# Patient Record
Sex: FEMALE | Race: WHITE | Employment: OTHER | URBAN - METROPOLITAN AREA
[De-identification: names, ages, dates, MRNs, and addresses within clinical notes are randomized per-mention and may not be internally consistent; named-entity substitution may affect disease eponyms.]

---

## 2021-08-11 ENCOUNTER — APPOINTMENT (OUTPATIENT)
Dept: CARDIOLOGY | Facility: CLINIC | Age: 86
DRG: 282 | End: 2021-08-11
Attending: NURSE PRACTITIONER
Payer: MEDICARE

## 2021-08-11 ENCOUNTER — TRANSFERRED RECORDS (OUTPATIENT)
Dept: HEALTH INFORMATION MANAGEMENT | Facility: CLINIC | Age: 86
End: 2021-08-11

## 2021-08-11 ENCOUNTER — HOSPITAL ENCOUNTER (INPATIENT)
Facility: CLINIC | Age: 86
LOS: 1 days | Discharge: HOME | DRG: 282 | End: 2021-08-12
Attending: INTERNAL MEDICINE | Admitting: INTERNAL MEDICINE
Payer: MEDICARE

## 2021-08-11 DIAGNOSIS — I50.33 ACUTE ON CHRONIC DIASTOLIC HEART FAILURE (H): ICD-10-CM

## 2021-08-11 DIAGNOSIS — I50.30 HEART FAILURE WITH PRESERVED EJECTION FRACTION, NYHA CLASS II (H): Primary | ICD-10-CM

## 2021-08-11 DIAGNOSIS — I10 BENIGN ESSENTIAL HYPERTENSION: ICD-10-CM

## 2021-08-11 PROBLEM — I21.4 NSTEMI (NON-ST ELEVATED MYOCARDIAL INFARCTION) (H): Status: ACTIVE | Noted: 2021-08-11

## 2021-08-11 LAB
ALBUMIN SERPL-MCNC: 3 G/DL (ref 3.4–5)
ALP SERPL-CCNC: 61 U/L (ref 40–150)
ALT SERPL W P-5'-P-CCNC: 26 U/L (ref 0–50)
ANION GAP SERPL CALCULATED.3IONS-SCNC: 4 MMOL/L (ref 3–14)
AST SERPL W P-5'-P-CCNC: 16 U/L (ref 0–45)
BI-PLANE LVEF ECHO: NORMAL
BILIRUB DIRECT SERPL-MCNC: <0.1 MG/DL (ref 0–0.2)
BILIRUB SERPL-MCNC: 0.3 MG/DL (ref 0.2–1.3)
BUN SERPL-MCNC: 31 MG/DL (ref 7–30)
CALCIUM SERPL-MCNC: 8.9 MG/DL (ref 8.5–10.1)
CHLORIDE BLD-SCNC: 110 MMOL/L (ref 94–109)
CHOLEST SERPL-MCNC: 154 MG/DL
CO2 SERPL-SCNC: 26 MMOL/L (ref 20–32)
CREAT SERPL-MCNC: 0.99 MG/DL (ref 0.52–1.04)
ERYTHROCYTE [DISTWIDTH] IN BLOOD BY AUTOMATED COUNT: 13 % (ref 10–15)
FASTING STATUS PATIENT QL REPORTED: YES
GFR SERPL CREATININE-BSD FRML MDRD: 49 ML/MIN/1.73M2
GLUCOSE BLD-MCNC: 130 MG/DL (ref 70–99)
HCT VFR BLD AUTO: 41.8 % (ref 35–47)
HDLC SERPL-MCNC: 66 MG/DL
HGB BLD-MCNC: 13.8 G/DL (ref 11.7–15.7)
LDLC SERPL CALC-MCNC: 63 MG/DL
LVEF ECHO: NORMAL
MCH RBC QN AUTO: 32 PG (ref 26.5–33)
MCHC RBC AUTO-ENTMCNC: 33 G/DL (ref 31.5–36.5)
MCV RBC AUTO: 97 FL (ref 78–100)
NONHDLC SERPL-MCNC: 88 MG/DL
PLATELET # BLD AUTO: 260 10E3/UL (ref 150–450)
POTASSIUM BLD-SCNC: 4.3 MMOL/L (ref 3.4–5.3)
PROT SERPL-MCNC: 6.7 G/DL (ref 6.8–8.8)
RBC # BLD AUTO: 4.31 10E6/UL (ref 3.8–5.2)
SODIUM SERPL-SCNC: 140 MMOL/L (ref 133–144)
TRIGL SERPL-MCNC: 127 MG/DL
TROPONIN I SERPL-MCNC: 0.86 UG/L (ref 0–0.04)
TROPONIN I SERPL-MCNC: 0.89 UG/L (ref 0–0.04)
WBC # BLD AUTO: 14.4 10E3/UL (ref 4–11)

## 2021-08-11 PROCEDURE — 93306 TTE W/DOPPLER COMPLETE: CPT

## 2021-08-11 PROCEDURE — 80048 BASIC METABOLIC PNL TOTAL CA: CPT | Performed by: NURSE PRACTITIONER

## 2021-08-11 PROCEDURE — 80061 LIPID PANEL: CPT | Performed by: NURSE PRACTITIONER

## 2021-08-11 PROCEDURE — 85027 COMPLETE CBC AUTOMATED: CPT | Performed by: NURSE PRACTITIONER

## 2021-08-11 PROCEDURE — 36415 COLL VENOUS BLD VENIPUNCTURE: CPT | Performed by: NURSE PRACTITIONER

## 2021-08-11 PROCEDURE — 93010 ELECTROCARDIOGRAM REPORT: CPT | Performed by: INTERNAL MEDICINE

## 2021-08-11 PROCEDURE — 250N000013 HC RX MED GY IP 250 OP 250 PS 637: Performed by: NURSE PRACTITIONER

## 2021-08-11 PROCEDURE — 99223 1ST HOSP IP/OBS HIGH 75: CPT | Mod: 25 | Performed by: INTERNAL MEDICINE

## 2021-08-11 PROCEDURE — 250N000011 HC RX IP 250 OP 636: Performed by: NURSE PRACTITIONER

## 2021-08-11 PROCEDURE — 84484 ASSAY OF TROPONIN QUANT: CPT | Performed by: NURSE PRACTITIONER

## 2021-08-11 PROCEDURE — 93306 TTE W/DOPPLER COMPLETE: CPT | Mod: 26 | Performed by: INTERNAL MEDICINE

## 2021-08-11 PROCEDURE — 82248 BILIRUBIN DIRECT: CPT | Performed by: NURSE PRACTITIONER

## 2021-08-11 PROCEDURE — 93005 ELECTROCARDIOGRAM TRACING: CPT

## 2021-08-11 PROCEDURE — 120N000003 HC R&B IMCU UMMC

## 2021-08-11 RX ORDER — NITROGLYCERIN 0.4 MG/1
0.4 TABLET SUBLINGUAL EVERY 5 MIN PRN
Status: DISCONTINUED | OUTPATIENT
Start: 2021-08-11 | End: 2021-08-12 | Stop reason: HOSPADM

## 2021-08-11 RX ORDER — HEPARIN SODIUM 5000 [USP'U]/.5ML
5000 INJECTION, SOLUTION INTRAVENOUS; SUBCUTANEOUS EVERY 12 HOURS
Status: DISCONTINUED | OUTPATIENT
Start: 2021-08-11 | End: 2021-08-12 | Stop reason: HOSPADM

## 2021-08-11 RX ORDER — MAGNESIUM HYDROXIDE/ALUMINUM HYDROXICE/SIMETHICONE 120; 1200; 1200 MG/30ML; MG/30ML; MG/30ML
30 SUSPENSION ORAL EVERY 4 HOURS PRN
Status: DISCONTINUED | OUTPATIENT
Start: 2021-08-11 | End: 2021-08-12 | Stop reason: HOSPADM

## 2021-08-11 RX ORDER — ALBUTEROL SULFATE 0.83 MG/ML
2.5 SOLUTION RESPIRATORY (INHALATION)
Status: DISCONTINUED | OUTPATIENT
Start: 2021-08-11 | End: 2021-08-12 | Stop reason: HOSPADM

## 2021-08-11 RX ORDER — ATORVASTATIN CALCIUM 40 MG/1
40 TABLET, FILM COATED ORAL EVERY EVENING
Status: DISCONTINUED | OUTPATIENT
Start: 2021-08-11 | End: 2021-08-12 | Stop reason: HOSPADM

## 2021-08-11 RX ORDER — ACETAMINOPHEN 325 MG/1
650 TABLET ORAL EVERY 4 HOURS PRN
Status: DISCONTINUED | OUTPATIENT
Start: 2021-08-11 | End: 2021-08-12 | Stop reason: HOSPADM

## 2021-08-11 RX ORDER — FUROSEMIDE 10 MG/ML
40 INJECTION INTRAMUSCULAR; INTRAVENOUS 2 TIMES DAILY
Status: DISCONTINUED | OUTPATIENT
Start: 2021-08-11 | End: 2021-08-12

## 2021-08-11 RX ORDER — ASPIRIN 81 MG/1
81 TABLET, CHEWABLE ORAL DAILY
Status: DISCONTINUED | OUTPATIENT
Start: 2021-08-11 | End: 2021-08-12 | Stop reason: HOSPADM

## 2021-08-11 RX ORDER — LISINOPRIL 10 MG/1
10 TABLET ORAL DAILY
Status: DISCONTINUED | OUTPATIENT
Start: 2021-08-11 | End: 2021-08-12

## 2021-08-11 RX ORDER — CLOPIDOGREL BISULFATE 75 MG/1
75 TABLET ORAL DAILY
Status: DISCONTINUED | OUTPATIENT
Start: 2021-08-11 | End: 2021-08-12 | Stop reason: HOSPADM

## 2021-08-11 RX ORDER — AMLODIPINE BESYLATE 10 MG/1
10 TABLET ORAL DAILY
Status: DISCONTINUED | OUTPATIENT
Start: 2021-08-11 | End: 2021-08-12 | Stop reason: HOSPADM

## 2021-08-11 RX ORDER — LIDOCAINE 40 MG/G
CREAM TOPICAL
Status: DISCONTINUED | OUTPATIENT
Start: 2021-08-11 | End: 2021-08-12 | Stop reason: HOSPADM

## 2021-08-11 RX ORDER — ISOSORBIDE MONONITRATE 60 MG/1
240 TABLET, EXTENDED RELEASE ORAL DAILY
Status: DISCONTINUED | OUTPATIENT
Start: 2021-08-11 | End: 2021-08-12 | Stop reason: HOSPADM

## 2021-08-11 RX ORDER — RANOLAZINE 500 MG/1
500 TABLET, EXTENDED RELEASE ORAL 2 TIMES DAILY
Status: DISCONTINUED | OUTPATIENT
Start: 2021-08-11 | End: 2021-08-12 | Stop reason: HOSPADM

## 2021-08-11 RX ORDER — ACETAMINOPHEN 650 MG/1
650 SUPPOSITORY RECTAL EVERY 4 HOURS PRN
Status: DISCONTINUED | OUTPATIENT
Start: 2021-08-11 | End: 2021-08-12 | Stop reason: HOSPADM

## 2021-08-11 RX ADMIN — AMLODIPINE BESYLATE 10 MG: 10 TABLET ORAL at 12:58

## 2021-08-11 RX ADMIN — ASPIRIN 81 MG: 81 TABLET, CHEWABLE ORAL at 12:58

## 2021-08-11 RX ADMIN — METOPROLOL TARTRATE 75 MG: 50 TABLET, FILM COATED ORAL at 12:58

## 2021-08-11 RX ADMIN — FUROSEMIDE 40 MG: 10 INJECTION, SOLUTION INTRAVENOUS at 19:45

## 2021-08-11 RX ADMIN — ISOSORBIDE MONONITRATE 240 MG: 60 TABLET, EXTENDED RELEASE ORAL at 17:10

## 2021-08-11 RX ADMIN — ATORVASTATIN CALCIUM 40 MG: 40 TABLET, FILM COATED ORAL at 19:44

## 2021-08-11 RX ADMIN — FUROSEMIDE 40 MG: 10 INJECTION, SOLUTION INTRAVENOUS at 12:59

## 2021-08-11 RX ADMIN — RANOLAZINE 500 MG: 500 TABLET, FILM COATED, EXTENDED RELEASE ORAL at 19:44

## 2021-08-11 RX ADMIN — LISINOPRIL 10 MG: 10 TABLET ORAL at 17:10

## 2021-08-11 RX ADMIN — CLOPIDOGREL BISULFATE 75 MG: 75 TABLET ORAL at 12:59

## 2021-08-11 RX ADMIN — HEPARIN SODIUM 5000 UNITS: 10000 INJECTION, SOLUTION INTRAVENOUS; SUBCUTANEOUS at 11:43

## 2021-08-11 ASSESSMENT — MIFFLIN-ST. JEOR: SCORE: 1109.53

## 2021-08-11 ASSESSMENT — ACTIVITIES OF DAILY LIVING (ADL)
ADLS_ACUITY_SCORE: 19

## 2021-08-11 NOTE — PROVIDER NOTIFICATION
Provider notified regarding more frequent PAC's. Previously was having 4/min, now having 11/min x 10 minutes. Pt is also bradycardic, 50-60 BPM

## 2021-08-11 NOTE — PLAN OF CARE
A/O X4. Able to make needs known, uses call light appropriately. No c/o of chest pain, SOB, N/V, dizziness, and denies pain. HR SR 60-70's, VSS; afebrile; Lung sounds audible wheezes on RA. Adequate output. bm x1. Pt up ax1 w/ walker to bathroom. EKG, ECHO completed. Tolerating low fat cardiac diet. Daughters at bedside. Will continue to follow plan of care.

## 2021-08-11 NOTE — PROGRESS NOTES
SPIRITUAL HEALTH SERVICES  SPIRITUAL ASSESSMENT Progress Note  [facility] 6B     REFERRAL SOURCE: Admission request    Attempted visit, pt in cares.     PLAN: Will communicate attempted visit to spiritual care team. Spiritual health services remains available for any follow-up or requests    Kia Montanez  Chaplain Resident  Pager: [phone]

## 2021-08-11 NOTE — PROVIDER NOTIFICATION
-------------------CRITICAL LAB VALUE-------------------    Lab Value: trop 0.893  Time of notification: 12:50 PM  MD notified: Cards 1  Patient status:  Temp:  [97.5  F (36.4  C)-98  F (36.7  C)] 98  F (36.7  C)  Pulse:  [66-69] 66  Resp:  [16-22] 16  BP: (162-172)/(67-80) 162/80  SpO2:  [97 %-98 %] 97 %  Orders received:   No new orders at this time. Trending trop levels Q4. Will cont to follow poc and notify team with any updates.

## 2021-08-11 NOTE — H&P
[facility]     History and Physicial  Cynthia Ramirez MRN: [id number]  4/30/1928  Date of Admission:8/11/2021  Primary care provider: Daniele Reagan      Assessment and Plan:     Cynthia Ramirez is a very pleasant 89 year old with past medical history significant for multi-vessel CAD, HTN, moderate aortic stenosis, and grade I diastolic dysfunction. She presented to ED at [facility] with worsening shortness of breath and lower extremity edema. Initial troponin was mildly elevated so patient was transferred to [facility] for concerns of NSTEMI in the setting of decompensated heart failure.     # SOB   # NSTEMI, likely type II   # Acute on chronic HFpEF exacerbation   # Multi-vessel CAD  # HTN  # Moderate aortic stenosis  # Grade I diastolic dysfunction likely 2/2 to obesity and hypertension   Patient reports one episode of chest pain yesterday that was relieved with nitroglycerin x 1. EKG with no acute changes. Initial troponin 0.3. NTpBNP 530 although falsely low due to body habitus. 's. CXR from OSH consistent with congestion. Per chart review, coronary angiogram in 2014 showed severe 3-vessel disease that included  of RCA filled by collaterals, 90% distal LCx with collaterals, as well as significant LAD/D1 disease. Left main was okay. She was referred for CABG, but ultimately decided to medically manage given her age. Echo from 11/2020 with hyperdynamic LV function with EF > 70%, grade I diastolic dysfunction, and mild to moderate aortic stenosis characterized by REYNA 0.98, peak velocity of 2.64 m/s, and MG 15 mmHg. Likely type II MI in the setting of decompensated heart failure and hypertension. Progression of CAD is a possibility, but patient not interested in pursuing further ischemic evaluation at this time. Hypervolemic on exam with 3+ JEAN, abdominal distention, and elevated JVP. Admission weight 162 lbs, EDW unknown.     - Admit to telemetry  - Trend troponins  - Volume  status: hypervolemic, start IV lasix 40 mg BID    - Continue PTA aspirin and plavix  - Continue PTA atorvastatin, metoprolol tartrate, quinapril, Imdur, ranexa, and amlodipine  - Hold PTA hydrochlorothiazide for  now  - Check lipid panel/LFTs  - Daily BMP/CBC  - Replace lytes per protocol  - Strict I/O's  - Daily weights  - Low sodium diet    - EKG prn   - Echocardiogram today       FEN: 2G sodium diet   Prophylaxis:  DVT: subcutaneous heparin   Disposition: Home in 1-2 days with cardiac rehab pending stable post-op period  Code Status: FULL CODE    Sandy Coleman DNP, APRN, CNP  [facility] Cardiology Team  [phone] 7a-5p           Chief Complaint:   Shortness of breath and increased JEAN          History of Present Illness:   Cynthia Ramirez is a very pleasant 89 year old with past medical history significant for multi-vessel CAD, HTN, moderate aortic stenosis, and grade I diastolic dysfunction. She presented to ED at [facility] with worsening shortness of breath and lower extremity edema. Initial troponin was mildly elevated so patient was transferred to [facility] for concerns of NSTEMI in the setting of decompensated heart failure.     Patient states she has progressively been more short of breath over the past week. She also reports worsening lower extremity edema. She had one episode of chest discomfort at rest that was relieved with sublingual nitroglycerin. Denies dizziness, syncope or pre-syncope, PND, orthopnea, or palpitations.     Per chart review, coronary angiogram in 2014 showed severe 3-vessel disease that included  of RCA filled by collaterals, 90% distal LCx with collaterals, as well as significant LAD/D1 disease. Left main was okay. She was referred for CABG, but ultimately decided to medically manage given her age. She follows with cardiology at [facility] in [place], [place]. Echo from 11/2020 with hyperdynamic LV function with EF > 70% and mild to moderate aortic stenosis characterized by REYNA  0.98, peak velocity of 2.64 m/s, and MG 15 mmHg. Current medication regimen is as follows: aspirin, plavix, atorvastatin, Imdur, metoprolol tartrate, ranexa, quinapril, and hydrochlorothiazide.     Upon arrival to floor, patient doing well. Hypertensive with 's. EKG without acute ST or T-wave changes. 3+ bilateral JEAN. Denies any symptoms. Reports improvement in breathing.          Review of Systems:    10 point review of systems negative except for stated above in HPI.          Past Medical History:   Medical History reviewed.   No past medical history on file.          Past Surgical History:   Surgical History reviewed.   No past surgical history on file.          Social History:   Social History reviewed.  Social History     Tobacco Use     Smoking status: Not on file   Substance Use Topics     Alcohol use: Not on file             Family History:   Family History reviewed.   No family history on file.          Allergies:   No Known Allergies          Medications:   Medications Reviewed.   Current Facility-Administered Medications   Medication     acetaminophen (TYLENOL) Suppository 650 mg     acetaminophen (TYLENOL) tablet 650 mg     albuterol (PROVENTIL) neb solution 2.5 mg     alum & mag hydroxide-simethicone (MAALOX) suspension 30 mL     amLODIPine (NORVASC) tablet 10 mg     aspirin (ASA) chewable tablet 81 mg     atorvastatin (LIPITOR) tablet 40 mg     clopidogrel (PLAVIX) tablet 75 mg     furosemide (LASIX) injection 40 mg     heparin ANTICOAGULANT injection 5,000 Units     lidocaine (LMX4) cream     lidocaine 1 % 0.1-1 mL     medication instruction     metoprolol tartrate (LOPRESSOR) tablet 75 mg     nitroGLYcerin (NITROSTAT) sublingual tablet 0.4 mg     sodium chloride (PF) 0.9% PF flush 3 mL     sodium chloride (PF) 0.9% PF flush 3 mL             Physical Exam:   Vitals were reviewed.  Blood pressure (!) 163/69, pulse 58, temperature 97.7  F (36.5  C), temperature source Oral, resp. rate 16, height  "1.6 m (5' 3\"), weight 73.5 kg (162 lb 2 oz), SpO2 97 %.    General: AAOx3, NAD  Skin: Not jaundiced, no rash, no ecchymoses;   HEENT: MMM, PERRLA, EOM intact  CV: RRR, normal S1S2, no murmur, clicks, rubs  Resp: Clear to auscultation bilaterally, wheezes at bases, rhonchi  Abd: Distended, non-tender, BS+, no masses appreciated  Extremities: warm and well perfused, palpable pulses, 3+ LE edema  Neuro: No lateralizing symptoms or focal neurologic deficits          Labs:   Routine Labs:  Lab Results   Component Value Date    TROPONIN 0.893 (HH) 08/11/2021     CMP  Recent Labs   Lab 08/11/21  1127      POTASSIUM 4.3   CHLORIDE 110*   CO2 26   ANIONGAP 4   *   BUN 31*   CR 0.99   GFRESTIMATED 49*   VERÓNICA 8.9   PROTTOTAL 6.7*   ALBUMIN 3.0*   BILITOTAL 0.3   ALKPHOS 61   AST 16   ALT 26     CBC  Recent Labs   Lab 08/11/21  1127   WBC 14.4*   RBC 4.31   HGB 13.8   HCT 41.8   MCV 97   MCH 32.0   MCHC 33.0   RDW 13.0        INRNo lab results found in last 7 days.        Diagnostics:    EKG 12Lead: 8/11/21      Echo: 11/2020  Summary:     * The left ventricle is decreased in size.     * Left ventricular systolic function is hyperdynamic.     * The estimated ejection fraction is >70%.     * There is mild to moderate concentric left ventricular hypertrophy.     * Mild left ventricular outflow tract obstruction.     * The aortic valve is calcified.     * There is mild aortic stenosis with a peak velocity of  264 cm/s, mean   gradient of  15.71 mmHg, aortic valve area of  0.98 cm2, and an NDSI of     0.40.     * There is mild aortic regurgitation.     * Prior study from 04/22/14.  No significant change..     Coronary Angiogram: 2014  CORONARY ANGIOGRAPHY   LEFT MAIN:    The left main is normal.     LAD:    The left anterior descending is a small caliber vessel but wraps the apex. There is 50% diffuse disease in the mid vessel. There is 80% discrete stenosis in the mid to distal vessel. The septal  " supplies extensive collaterals to the right PDA. The diagonal branch is a 2.0 mm diameter vessel. There is 90% diffuse disease of the proximal segment.     CIRCUMFLEX:    The left circumflex artery is a large but nondominant vessel. There is a 60% discrete stenosis in the proximal segment. There is 90% diffuse disease of the distal vessel. The distal vessel supplies collaterals to the right posterolateral artery.     RCA:     The right coronary artery is dominant. There is a 40% discrete stenosis proximally. There is 100% chronic total occlusion of the mid vessel. The distal vessel is seen filling via left-to-right collaterals. There is 80% diffuse disease of the distal right coronary artery prior to the bifurcation.  The right PDA and posterolateral arteries. Have trivial disease.     FINAL IMPRESSION         1) Severe three-vessel coronary artery disease.       2) Surgical consultation will be obtained.

## 2021-08-11 NOTE — PROGRESS NOTES
Admission          8/11/2021  9:37 AM  -----------------------------------------------------------  Reason for admission: chest pain, sob  Primary team notified of pt arrival.  Admitted from: [facility]  Via: stretcher  Accompanied by: family on way   Belongings: Placed in closet  Admission Profile: complete  Teaching: orientation to unit and call light- call light within reach, call don't fall, use of console, meal times, when to call for the RN, and enforced importance of safety   Access: right and left PIV  Telemetry:Placed on pt  Ht./Wt.: complete  Code Status verified on armband: yes. No code on file yet.  2 RN Skin Assessment Completed By: Ron Fraser Rec completed: in progress  Bed surface reassessed with algorithm and charted: yes  New bed surface ordered: no  Suction/Ambu bag/Flowmeter at bedside: yes    Pt status:    Temp:  [97.5  F (36.4  C)] 97.5  F (36.4  C)  Pulse:  [69] 69  Resp:  [22] 22  BP: (172)/(67) 172/67  SpO2:  [98 %] 98 %    Pt a/o x4. VSS. On RA. Denies SOB/SOB. Blanchable redness on coccyx, generalized bruising through out. ble edema +2. Will cont to follow POC.

## 2021-08-12 VITALS
RESPIRATION RATE: 18 BRPM | BODY MASS INDEX: 27.62 KG/M2 | TEMPERATURE: 97.7 F | OXYGEN SATURATION: 95 % | DIASTOLIC BLOOD PRESSURE: 66 MMHG | SYSTOLIC BLOOD PRESSURE: 123 MMHG | WEIGHT: 155.87 LBS | HEIGHT: 63 IN | HEART RATE: 83 BPM

## 2021-08-12 LAB
ANION GAP SERPL CALCULATED.3IONS-SCNC: 7 MMOL/L (ref 3–14)
ATRIAL RATE - MUSE: 66 BPM
BUN SERPL-MCNC: 34 MG/DL (ref 7–30)
CALCIUM SERPL-MCNC: 8.3 MG/DL (ref 8.5–10.1)
CHLORIDE BLD-SCNC: 105 MMOL/L (ref 94–109)
CO2 SERPL-SCNC: 29 MMOL/L (ref 20–32)
CREAT SERPL-MCNC: 1.15 MG/DL (ref 0.52–1.04)
DIASTOLIC BLOOD PRESSURE - MUSE: NORMAL MMHG
ERYTHROCYTE [DISTWIDTH] IN BLOOD BY AUTOMATED COUNT: 13 % (ref 10–15)
GFR SERPL CREATININE-BSD FRML MDRD: 41 ML/MIN/1.73M2
GLUCOSE BLD-MCNC: 120 MG/DL (ref 70–99)
HCT VFR BLD AUTO: 42.3 % (ref 35–47)
HGB BLD-MCNC: 13.8 G/DL (ref 11.7–15.7)
INTERPRETATION ECG - MUSE: NORMAL
MCH RBC QN AUTO: 31.4 PG (ref 26.5–33)
MCHC RBC AUTO-ENTMCNC: 32.6 G/DL (ref 31.5–36.5)
MCV RBC AUTO: 96 FL (ref 78–100)
P AXIS - MUSE: 58 DEGREES
PLATELET # BLD AUTO: 252 10E3/UL (ref 150–450)
POTASSIUM BLD-SCNC: 3.6 MMOL/L (ref 3.4–5.3)
PR INTERVAL - MUSE: 206 MS
QRS DURATION - MUSE: 92 MS
QT - MUSE: 416 MS
QTC - MUSE: 436 MS
R AXIS - MUSE: -22 DEGREES
RBC # BLD AUTO: 4.39 10E6/UL (ref 3.8–5.2)
SODIUM SERPL-SCNC: 141 MMOL/L (ref 133–144)
SYSTOLIC BLOOD PRESSURE - MUSE: NORMAL MMHG
T AXIS - MUSE: 47 DEGREES
VENTRICULAR RATE- MUSE: 66 BPM
WBC # BLD AUTO: 10.5 10E3/UL (ref 4–11)

## 2021-08-12 PROCEDURE — 36415 COLL VENOUS BLD VENIPUNCTURE: CPT | Performed by: NURSE PRACTITIONER

## 2021-08-12 PROCEDURE — 85027 COMPLETE CBC AUTOMATED: CPT | Performed by: NURSE PRACTITIONER

## 2021-08-12 PROCEDURE — 80048 BASIC METABOLIC PNL TOTAL CA: CPT | Performed by: NURSE PRACTITIONER

## 2021-08-12 PROCEDURE — 250N000013 HC RX MED GY IP 250 OP 250 PS 637: Performed by: NURSE PRACTITIONER

## 2021-08-12 PROCEDURE — 250N000011 HC RX IP 250 OP 636: Performed by: NURSE PRACTITIONER

## 2021-08-12 PROCEDURE — 99239 HOSP IP/OBS DSCHRG MGMT >30: CPT | Performed by: INTERNAL MEDICINE

## 2021-08-12 RX ORDER — RANOLAZINE 500 MG/1
500 TABLET, EXTENDED RELEASE ORAL 2 TIMES DAILY
Qty: 60 TABLET | Refills: 0 | Status: CANCELLED | OUTPATIENT
Start: 2021-08-12

## 2021-08-12 RX ORDER — FUROSEMIDE 40 MG
40 TABLET ORAL DAILY
Qty: 90 TABLET | Refills: 0 | Status: SHIPPED | OUTPATIENT
Start: 2021-08-13

## 2021-08-12 RX ORDER — FOLIC ACID/MULTIVIT,IRON,MINER 0.4MG-18MG
1 TABLET ORAL DAILY
COMMUNITY

## 2021-08-12 RX ORDER — CLOPIDOGREL BISULFATE 75 MG/1
75 TABLET ORAL DAILY
Qty: 30 TABLET | Refills: 0 | Status: CANCELLED | OUTPATIENT
Start: 2021-08-13

## 2021-08-12 RX ORDER — ATORVASTATIN CALCIUM 40 MG/1
40 TABLET, FILM COATED ORAL EVERY EVENING
Qty: 30 TABLET | Refills: 0 | Status: CANCELLED | OUTPATIENT
Start: 2021-08-12

## 2021-08-12 RX ORDER — AMLODIPINE BESYLATE 10 MG/1
10 TABLET ORAL DAILY
Qty: 30 TABLET | Refills: 0 | Status: CANCELLED | OUTPATIENT
Start: 2021-08-13

## 2021-08-12 RX ORDER — CARVEDILOL 6.25 MG/1
6.25 TABLET ORAL 2 TIMES DAILY WITH MEALS
Qty: 180 TABLET | Refills: 0 | Status: SHIPPED | OUTPATIENT
Start: 2021-08-12

## 2021-08-12 RX ORDER — ATORVASTATIN CALCIUM 40 MG/1
40 TABLET, FILM COATED ORAL DAILY
COMMUNITY
Start: 2021-04-29

## 2021-08-12 RX ORDER — NITROGLYCERIN 0.4 MG/1
0.4 TABLET SUBLINGUAL EVERY 5 MIN PRN
COMMUNITY
Start: 2020-06-18

## 2021-08-12 RX ORDER — CARVEDILOL 6.25 MG/1
6.25 TABLET ORAL 2 TIMES DAILY WITH MEALS
Status: DISCONTINUED | OUTPATIENT
Start: 2021-08-12 | End: 2021-08-12 | Stop reason: HOSPADM

## 2021-08-12 RX ORDER — LISINOPRIL 20 MG/1
20 TABLET ORAL DAILY
Qty: 30 TABLET | Refills: 0 | Status: CANCELLED | OUTPATIENT
Start: 2021-08-13

## 2021-08-12 RX ORDER — RANOLAZINE 500 MG/1
500 TABLET, EXTENDED RELEASE ORAL 2 TIMES DAILY
COMMUNITY
Start: 2021-04-29

## 2021-08-12 RX ORDER — ISOSORBIDE MONONITRATE 120 MG/1
240 TABLET, EXTENDED RELEASE ORAL DAILY
COMMUNITY
Start: 2021-04-29

## 2021-08-12 RX ORDER — LISINOPRIL 20 MG/1
20 TABLET ORAL DAILY
Status: DISCONTINUED | OUTPATIENT
Start: 2021-08-12 | End: 2021-08-12 | Stop reason: HOSPADM

## 2021-08-12 RX ORDER — AMLODIPINE BESYLATE 10 MG/1
10 TABLET ORAL DAILY
COMMUNITY
Start: 2020-12-03

## 2021-08-12 RX ORDER — ISOSORBIDE MONONITRATE 120 MG/1
240 TABLET, EXTENDED RELEASE ORAL DAILY
Qty: 60 TABLET | Refills: 0 | Status: CANCELLED | OUTPATIENT
Start: 2021-08-13

## 2021-08-12 RX ORDER — FLUTICASONE PROPIONATE 50 MCG
1 SPRAY, SUSPENSION (ML) NASAL DAILY
COMMUNITY
Start: 2021-08-09 | End: 2022-08-09

## 2021-08-12 RX ORDER — FUROSEMIDE 40 MG
40 TABLET ORAL DAILY
Qty: 30 TABLET | Refills: 3 | Status: CANCELLED | OUTPATIENT
Start: 2021-08-13

## 2021-08-12 RX ORDER — CLOPIDOGREL BISULFATE 75 MG/1
75 TABLET ORAL DAILY
COMMUNITY
Start: 2021-04-29

## 2021-08-12 RX ORDER — QUINAPRIL 20 MG/1
20 TABLET ORAL 2 TIMES DAILY
COMMUNITY
Start: 2021-04-29

## 2021-08-12 RX ORDER — FUROSEMIDE 40 MG
40 TABLET ORAL DAILY
Status: DISCONTINUED | OUTPATIENT
Start: 2021-08-13 | End: 2021-08-12 | Stop reason: HOSPADM

## 2021-08-12 RX ORDER — HYDROCHLOROTHIAZIDE 25 MG/1
25 TABLET ORAL DAILY
Status: ON HOLD | COMMUNITY
Start: 2021-04-29 | End: 2021-08-12

## 2021-08-12 RX ORDER — METOPROLOL TARTRATE 50 MG
75 TABLET ORAL 2 TIMES DAILY
Status: ON HOLD | COMMUNITY
Start: 2021-04-28 | End: 2021-08-12

## 2021-08-12 RX ORDER — CARVEDILOL 6.25 MG/1
6.25 TABLET ORAL 2 TIMES DAILY WITH MEALS
Qty: 60 TABLET | Refills: 0 | Status: CANCELLED | OUTPATIENT
Start: 2021-08-12

## 2021-08-12 RX ORDER — ASPIRIN 81 MG/1
81 TABLET, CHEWABLE ORAL DAILY
Qty: 30 TABLET | Refills: 0 | Status: CANCELLED | OUTPATIENT
Start: 2021-08-13

## 2021-08-12 RX ORDER — POTASSIUM CHLORIDE 1500 MG/1
20 TABLET, EXTENDED RELEASE ORAL DAILY
COMMUNITY
Start: 2021-04-29

## 2021-08-12 RX ADMIN — ASPIRIN 81 MG: 81 TABLET, CHEWABLE ORAL at 07:42

## 2021-08-12 RX ADMIN — CLOPIDOGREL BISULFATE 75 MG: 75 TABLET ORAL at 07:43

## 2021-08-12 RX ADMIN — AMLODIPINE BESYLATE 10 MG: 10 TABLET ORAL at 07:42

## 2021-08-12 RX ADMIN — FUROSEMIDE 40 MG: 10 INJECTION, SOLUTION INTRAVENOUS at 07:43

## 2021-08-12 RX ADMIN — LISINOPRIL 20 MG: 10 TABLET ORAL at 07:43

## 2021-08-12 RX ADMIN — CARVEDILOL 6.25 MG: 6.25 TABLET, FILM COATED ORAL at 07:47

## 2021-08-12 RX ADMIN — ISOSORBIDE MONONITRATE 240 MG: 60 TABLET, EXTENDED RELEASE ORAL at 07:42

## 2021-08-12 RX ADMIN — HEPARIN SODIUM 5000 UNITS: 10000 INJECTION, SOLUTION INTRAVENOUS; SUBCUTANEOUS at 00:01

## 2021-08-12 RX ADMIN — RANOLAZINE 500 MG: 500 TABLET, FILM COATED, EXTENDED RELEASE ORAL at 07:42

## 2021-08-12 ASSESSMENT — ACTIVITIES OF DAILY LIVING (ADL)
ADLS_ACUITY_SCORE: 19

## 2021-08-12 ASSESSMENT — MIFFLIN-ST. JEOR: SCORE: 1081.13

## 2021-08-12 NOTE — PLAN OF CARE
Neuro: A&Ox4.   Cardiac: SR-SB. VSS.   Respiratory: Sating adequately on 2L NC while sleeping.  GI/: Adequate urine output.   Diet/appetite: Tolerating low fat diet.  Activity:  Assist of SBA, ambulated to the bathroom during the night.  Pain: At acceptable level on current regimen.   Skin: No new deficits noted.  LDA's: PIVx2    Plan: Continue with POC. Notify primary team with changes. Pt slept well overnight.

## 2021-08-12 NOTE — PLAN OF CARE
DISCHARGE                         No discharge date for patient encounter.  ----------------------------------------------------------------------------  Discharged to: Home  Via: private transportation  Accompanied by: Family  Discharge Instructions: diet, activity, medications, follow up appointments, when to call the MD, aftercare instructions.  Prescriptions: To be filled by [facility] pharmacy; medication list reviewed & sent with pt  Follow Up Appointments: arranged; information given  Belongings: All sent with pt  IV: d/c'd  Telemetry: d/c'd  Pt exhibits understanding of above discharge instructions; all questions answered.    Discharge Paperwork: Signed, copied, and sent home with patient.

## 2021-08-12 NOTE — DISCHARGE SUMMARY
[address] [address]  p: [phone]    Discharge Summary: Cardiology Service    Cynthia Ramirez MRN# [id number]   YOB: 1928 Age: 89 year old       Admission Date: 8/11/2021  Discharge Date: 08/12/21    Discharge Diagnoses:  1. SOB  2. Type II MI in the setting of acute on chronic HFpEF exacerbation  3. Acute on chronic HFpEF exacerbation  4. Multi-vessel CAD  5. HTN  6. Moderate aortic stenosis  7. Grade I diastolic dysfunction     Brief HPI:  Cynthia Ramirez is a very pleasant 89 year old with past medical history significant for multi-vessel CAD, HTN, moderate aortic stenosis, and grade I diastolic dysfunction. She presented to ED at [facility] in [place] with worsening shortness of breath and lower extremity edema. Initial troponin was mildly elevated so patient was transferred to [facility] for concerns of NSTEMI in the setting of decompensated heart failure.      Patient states she has progressively been more short of breath over the past week. She also reports worsening lower extremity edema. She had one episode of chest discomfort at rest that was relieved with sublingual nitroglycerin. Denies dizziness, syncope or pre-syncope, PND, orthopnea, or palpitations.      Per chart review, coronary angiogram in 2014 showed severe 3-vessel disease that included  of RCA filled by collaterals, 90% distal LCx with collaterals, as well as significant LAD/D1 disease. Left main was okay. She was referred for CABG, but ultimately decided to medically manage given her age. She follows with cardiology at [facility] in [place], [place]. Echo from 11/2020 with hyperdynamic LV function with EF > 70% and mild to moderate aortic stenosis characterized by REYNA 0.98, peak velocity of 2.64 m/s, and MG 15 mmHg. Current medication regimen is as follows: aspirin, plavix, atorvastatin, Imdur, metoprolol tartrate, ranexa, quinapril, and hydrochlorothiazide.      Hospital Course by Diagnosis:  # SOB   # Type II MI in the setting of acute on chronic HFpEF exacerbation   # Acute on chronic HFpEF exacerbation   # Multi-vessel CAD  # HTN  # Moderate aortic stenosis  # Grade I diastolic dysfunction likely 2/2 to obesity and hypertension   EKG with no acute changes. Initial troponin 0.3 > 0.8. NTpBNP 530 although falsely low due to body habitus. 's. CXR from OSH consistent with congestion. Echo this admission showed LVEF of 65-70%, moderate to severe LVH, and moderate aortic stenosis (PV 2.9 m/s, MG 19 mmHg, and REYNA 1.2 cm). Likely type II MI in the setting of decompensated heart failure and hypertension. Progression of CAD is a possibility, but patient not interested in pursuing further ischemic evaluation at this time. Initially hypervolemic on exam with 3+ bilateral JEAN, abdominal distention, and elevated JVP. Decent response to IV diuresis. Admission weight 162 lbs, discharge weight 155 lbs (EDW unknown).      - Diuretic: start oral lasix 40 mg daily   - Continue PTA aspirin and plavix  - Continue PTA atorvastatin, quinapril, Imdur, ranexa, and amlodipine  - Stop PTA metoprolol tartrate, START carvedilol 6.25 mg BID for improved BP control, defer further titration to OP setting   - Stop PTA hydrochlorothiazide  - Low sodium diet    - Repeat BMP in 1 week   - Follow-up with primary cardiologist at [facility] in 2 weeks   - Follow-up with PCP in 7-10 days     Medication Changes:  See below     Discharge medications:   Current Discharge Medication List      START taking these medications    Details   carvedilol (COREG) 6.25 MG tablet Take 1 tablet (6.25 mg) by mouth 2 times daily (with meals)  Qty: 180 tablet, Refills: 0    Associated Diagnoses: Acute on chronic diastolic heart failure (H); Benign essential hypertension      furosemide (LASIX) 40 MG tablet Take 1 tablet (40 mg) by mouth daily  Qty: 90 tablet, Refills: 0    Associated Diagnoses: Acute on chronic  diastolic heart failure (H)         CONTINUE these medications which have NOT CHANGED    Details   amLODIPine (NORVASC) 10 MG tablet Take 10 mg by mouth daily      atorvastatin (LIPITOR) 40 MG tablet Take 40 mg by mouth daily      calcium carbonate 600 mg-vitamin D 400 units (CALTRATE) 600-400 MG-UNIT per tablet Take 1 tablet by mouth 2 times daily      clopidogrel (PLAVIX) 75 MG tablet Take 75 mg by mouth daily      fluticasone (FLONASE) 50 MCG/ACT nasal spray Spray 1 spray in nostril daily      isosorbide mononitrate CR (IMDUR) 120 MG 24 HR ER tablet Take 240 mg by mouth daily      nitroGLYcerin (NITROSTAT) 0.4 MG sublingual tablet Place 0.4 mg under the tongue every 5 minutes as needed      potassium chloride ER (KLOR-CON M) 20 MEQ CR tablet Take 20 mEq by mouth daily      quinapril (ACCUPRIL) 20 MG tablet Take 20 mg by mouth 2 times daily      ranolazine (RANEXA) 500 MG 12 hr tablet Take 500 mg by mouth 2 times daily      Aspirin Buf,CaCarb-MgCarb-MgO, 81 MG TABS Take 2 tablets by mouth daily      Omega-3 Fatty Acids (OMEGA-3 FISH OIL) 1200 MG CAPS Take 1 tablet by mouth daily         STOP taking these medications       hydrochlorothiazide (HYDRODIURIL) 25 MG tablet Comments:   Reason for Stopping:         metoprolol tartrate (LOPRESSOR) 50 MG tablet Comments:   Reason for Stopping:               Follow-up:  - Primary cardiology in 2 weeks  - PCP in 7-10 days     Labs or imaging requiring follow-up after discharge:  BMP in 1 week     Code status:  Full     Condition on discharge  Temp:  [97.7  F (36.5  C)-98  F (36.7  C)] 97.7  F (36.5  C)  Pulse:  [58-94] 83  Resp:  [16-18] (P) 18  BP: (120-163)/(49-82) (P) 123/66  Cuff Mean (mmHg):  [106] 106  SpO2:  [88 %-99 %] 95 %  General: Alert, interactive, NAD  Eyes: sclera anicteric, EOMI  Neck: JVP 0, carotid 2+ bilaterally  Cardiovascular: regular rate and rhythm, normal S1 and S2, no murmurs, gallops, or rubs  Resp: clear to auscultation bilaterally, no rales,  wheezes, or rhonchi  GI: Soft, nontender, nondistended. +BS.  No HSM or masses, no rebound or guarding.  Extremities: 1+ LE edema, no cyanosis or clubbing, dorsalis pedis and posterior tibialis pulses 2+ bilaterally  Skin: Warm and dry, no jaundice or rash  Neuro: CN 2-12 intact, moves all extremities equally  Psych: Alert & oriented x 3    Imaging with results:  Echocardiogram: 8/11/21  Interpretation Summary  Global and regional left ventricular function is hyperkinetic with an EF of  65-70%.  Right ventricular function, chamber size, wall motion, and thickness are  normal.  Moderate aortic stenosis is present (PV 2.9 m/s MG 19 mmHg DVI 0.38 REYNA 1.2 cm  SVI 43 mL/m2.) Mild aortic insufficiency is present.  IVC diameter <2.1 cm collapsing >50% with sniff suggests a normal RA pressure  of 3 mmHg.  Previous study not available for comparison.    Patient Care Team:  Daniele Reagan MD as PCP - General    Sadny Coleman DNP, APRN, CNP  [facility] Cardiology  [phone]

## 2021-08-12 NOTE — PLAN OF CARE
"NEURO: A&O x4  VITALS: Blood pressure 120/49, pulse 65, temperature 97.8  F (36.6  C), temperature source Oral, resp. rate 18, height 1.6 m (5' 3\"), weight 73.5 kg (162 lb 2 oz), SpO2 97 %.  PAIN: Denies  CARDIAC: SR w/ PAC's  RESPIRATORY: 1L NC  GI: Bowel sounds active, no BM, adequate PO intake  : Adequate UOP  LDA: PIV x2  IV: Saline locked  ACTIVITY: Up with SBA and walker  GOALS: Continue to trend trops    "

## 2021-08-17 NOTE — UTILIZATION REVIEW
Admission Status; Secondary Review Determination    No action to be taken. Please contact me on my Email : [email or web address] if you have any questions.    As part of the [place] Utilization review plan, a self-audit is done on Medicare inpatient admission with less than 2 midnights stay. The 2014 IPPS Final Rule allows outpatient billing in the event that a hospital determines that an inpatient admission was not medically necessary under utilization review process.     (x) Outpatient status would be Appropriate- Short Stay- Post discharge review.    RATIONALE FOR DETERMINATION  This is a case of a patient with Type 2 MI, thought to be due to decompensated HF, treated with diuresis. Under HF, the patient will not meet inpatient status.    Patient was admitted and discharge after one night stay. Record was sent by  for a PA review. Based on the  severity of illness, intensity of service provided, expected LOS and risk for adverse outcome make the care appropriate for further outpatient/observation; however, doesn't meet criteria for hospital inpatient admission.       The information on this document is developed by the utilization review team in order for the business office to ensure compliance.  This only denotes the appropriateness of proper admission status and does not reflect the quality of care rendered.       The definitions of Inpatient Status and Observation Status used in making the determination above are those provided in the CMS Coverage Manual, Chapter 1 and Chapter 6, section 70.4.     Please cont me if you want to discuss further about this admission episode.      Shelly Adler MD, FACP, VALERIA  Medical Director - Utilization management  Staff Hospitalist  [facility]    Pager: [phone]

## 2021-10-08 ENCOUNTER — DOCUMENTATION ONLY (OUTPATIENT)
Dept: OTHER | Facility: CLINIC | Age: 86
End: 2021-10-08